# Patient Record
(demographics unavailable — no encounter records)

---

## 2025-05-08 NOTE — PHYSICAL EXAM
[Normal S1, S2] : normal S1, S2 [Murmur] : murmur [Normal] : moves all extremities, no focal deficits, normal speech [de-identified] : faint murmur

## 2025-05-08 NOTE — DISCUSSION/SUMMARY
[EKG obtained to assist in diagnosis and management of assessed problem(s)] : EKG obtained to assist in diagnosis and management of assessed problem(s) [FreeTextEntry1] : 1. History of Murmur: advise echocardiogram. Once echocardiogram reviewed, I can make further recommendations regarding the perioperative care of this patient.  Office will call with results.

## 2025-05-08 NOTE — HISTORY OF PRESENT ILLNESS
[FreeTextEntry1] : 41 year old female with no significant PMHx presents for a cardiac evaluation prior to breast reduction surgery, 5/14/2025. Patient states she was told of a murmur in the past, but never had an echocardiogram. She has states she had COVID in March 2025. During the infection she had chest pain and dyspnea, which lingered for a few weeks, but symptoms resolved.   There is no history of MI, CVA, CHF, or previous coronary intervention.  There is no known family history in first degree relatives of cardiovascular disease.

## 2025-05-08 NOTE — PHYSICAL EXAM
[Normal S1, S2] : normal S1, S2 [Murmur] : murmur [Normal] : moves all extremities, no focal deficits, normal speech [de-identified] : faint murmur